# Patient Record
Sex: FEMALE | Race: OTHER | HISPANIC OR LATINO | URBAN - METROPOLITAN AREA
[De-identification: names, ages, dates, MRNs, and addresses within clinical notes are randomized per-mention and may not be internally consistent; named-entity substitution may affect disease eponyms.]

---

## 2024-10-16 ENCOUNTER — EMERGENCY (EMERGENCY)
Facility: HOSPITAL | Age: 44
LOS: 1 days | Discharge: HOME | End: 2024-10-16
Attending: EMERGENCY MEDICINE | Admitting: EMERGENCY MEDICINE
Payer: OTHER MISCELLANEOUS

## 2024-10-16 VITALS
RESPIRATION RATE: 18 BRPM | DIASTOLIC BLOOD PRESSURE: 86 MMHG | SYSTOLIC BLOOD PRESSURE: 139 MMHG | WEIGHT: 220.46 LBS | HEART RATE: 109 BPM | HEIGHT: 69 IN | OXYGEN SATURATION: 97 % | TEMPERATURE: 98 F

## 2024-10-16 LAB
ALBUMIN SERPL ELPH-MCNC: 4 G/DL — SIGNIFICANT CHANGE UP (ref 3.4–5)
ALP SERPL-CCNC: 68 U/L — SIGNIFICANT CHANGE UP (ref 40–120)
ALT FLD-CCNC: 30 U/L — SIGNIFICANT CHANGE UP (ref 12–42)
ANION GAP SERPL CALC-SCNC: 7 MMOL/L — LOW (ref 9–16)
AST SERPL-CCNC: 24 U/L — SIGNIFICANT CHANGE UP (ref 15–37)
BASOPHILS # BLD AUTO: 0.05 K/UL — SIGNIFICANT CHANGE UP (ref 0–0.2)
BASOPHILS NFR BLD AUTO: 0.7 % — SIGNIFICANT CHANGE UP (ref 0–2)
BILIRUB SERPL-MCNC: 0.2 MG/DL — SIGNIFICANT CHANGE UP (ref 0.2–1.2)
BUN SERPL-MCNC: 14 MG/DL — SIGNIFICANT CHANGE UP (ref 7–23)
CALCIUM SERPL-MCNC: 9.4 MG/DL — SIGNIFICANT CHANGE UP (ref 8.5–10.5)
CHLORIDE SERPL-SCNC: 105 MMOL/L — SIGNIFICANT CHANGE UP (ref 96–108)
CO2 SERPL-SCNC: 28 MMOL/L — SIGNIFICANT CHANGE UP (ref 22–31)
CREAT SERPL-MCNC: 0.95 MG/DL — SIGNIFICANT CHANGE UP (ref 0.5–1.3)
EGFR: 76 ML/MIN/1.73M2 — SIGNIFICANT CHANGE UP
EOSINOPHIL # BLD AUTO: 0.46 K/UL — SIGNIFICANT CHANGE UP (ref 0–0.5)
EOSINOPHIL NFR BLD AUTO: 6.6 % — HIGH (ref 0–6)
GLUCOSE SERPL-MCNC: 105 MG/DL — HIGH (ref 70–99)
HCG SERPL-ACNC: 1 MIU/ML — SIGNIFICANT CHANGE UP
HCT VFR BLD CALC: 38.9 % — SIGNIFICANT CHANGE UP (ref 34.5–45)
HGB BLD-MCNC: 11.6 G/DL — SIGNIFICANT CHANGE UP (ref 11.5–15.5)
HIV 1 & 2 AB SERPL IA.RAPID: SIGNIFICANT CHANGE UP
IMM GRANULOCYTES NFR BLD AUTO: 0.4 % — SIGNIFICANT CHANGE UP (ref 0–0.9)
LYMPHOCYTES # BLD AUTO: 1.61 K/UL — SIGNIFICANT CHANGE UP (ref 1–3.3)
LYMPHOCYTES # BLD AUTO: 23.1 % — SIGNIFICANT CHANGE UP (ref 13–44)
MCHC RBC-ENTMCNC: 28.2 PG — SIGNIFICANT CHANGE UP (ref 27–34)
MCHC RBC-ENTMCNC: 29.8 GM/DL — LOW (ref 32–36)
MCV RBC AUTO: 94.4 FL — SIGNIFICANT CHANGE UP (ref 80–100)
MONOCYTES # BLD AUTO: 0.61 K/UL — SIGNIFICANT CHANGE UP (ref 0–0.9)
MONOCYTES NFR BLD AUTO: 8.8 % — SIGNIFICANT CHANGE UP (ref 2–14)
NEUTROPHILS # BLD AUTO: 4.2 K/UL — SIGNIFICANT CHANGE UP (ref 1.8–7.4)
NEUTROPHILS NFR BLD AUTO: 60.4 % — SIGNIFICANT CHANGE UP (ref 43–77)
NRBC # BLD: 0 /100 WBCS — SIGNIFICANT CHANGE UP (ref 0–0)
PLATELET # BLD AUTO: 296 K/UL — SIGNIFICANT CHANGE UP (ref 150–400)
POTASSIUM SERPL-MCNC: 4 MMOL/L — SIGNIFICANT CHANGE UP (ref 3.5–5.3)
POTASSIUM SERPL-SCNC: 4 MMOL/L — SIGNIFICANT CHANGE UP (ref 3.5–5.3)
PROT SERPL-MCNC: 7.6 G/DL — SIGNIFICANT CHANGE UP (ref 6.4–8.2)
RBC # BLD: 4.12 M/UL — SIGNIFICANT CHANGE UP (ref 3.8–5.2)
RBC # FLD: 14.8 % — HIGH (ref 10.3–14.5)
SODIUM SERPL-SCNC: 140 MMOL/L — SIGNIFICANT CHANGE UP (ref 132–145)
WBC # BLD: 6.96 K/UL — SIGNIFICANT CHANGE UP (ref 3.8–10.5)
WBC # FLD AUTO: 6.96 K/UL — SIGNIFICANT CHANGE UP (ref 3.8–10.5)

## 2024-10-16 PROCEDURE — 99284 EMERGENCY DEPT VISIT MOD MDM: CPT

## 2024-10-16 RX ORDER — CEPHALEXIN 500 MG
1 CAPSULE ORAL
Qty: 28 | Refills: 0
Start: 2024-10-16 | End: 2024-10-22

## 2024-10-16 RX ORDER — TETANUS TOXOID, REDUCED DIPHTHERIA TOXOID AND ACELLULAR PERTUSSIS VACCINE, ADSORBED 5; 2.5; 8; 8; 2.5 [IU]/.5ML; [IU]/.5ML; UG/.5ML; UG/.5ML; UG/.5ML
0.5 SUSPENSION INTRAMUSCULAR ONCE
Refills: 0 | Status: COMPLETED | OUTPATIENT
Start: 2024-10-16 | End: 2024-10-16

## 2024-10-16 RX ORDER — EMTRICITABINE AND TENOFOVIR ALAFENAMIDE 200; 25 MG/1; MG/1
1 TABLET ORAL
Qty: 30 | Refills: 0
Start: 2024-10-16 | End: 2024-11-14

## 2024-10-16 RX ORDER — RALTEGRAVIR POTASSIUM 400 MG
1 TABLET ORAL
Qty: 60 | Refills: 0
Start: 2024-10-16 | End: 2024-11-14

## 2024-10-16 RX ADMIN — TETANUS TOXOID, REDUCED DIPHTHERIA TOXOID AND ACELLULAR PERTUSSIS VACCINE, ADSORBED 0.5 MILLILITER(S): 5; 2.5; 8; 8; 2.5 SUSPENSION INTRAMUSCULAR at 12:04

## 2024-10-16 NOTE — ED PROVIDER NOTE - CLINICAL SUMMARY MEDICAL DECISION MAKING FREE TEXT BOX
Patient with history of asthma presents with needlestick sustained while moving a trash bag just prior to arrival in ED.  On exam, patient is afebrile, vital signs are stable.  Patient is well-appearing in no acute distress.  Will update tetanus, start HIV PEP, per patient preference.  Will check basic labs including hep C and HIV.

## 2024-10-16 NOTE — ED PROVIDER NOTE - PATIENT PORTAL LINK FT
You can access the FollowMyHealth Patient Portal offered by [facility] by registering at the following website: [email or web address]. By joining [facility]’s FollowMyHealth portal, you will also be able to view your health information using other applications (apps) compatible with our system.

## 2024-10-16 NOTE — ED PROVIDER NOTE - NSFOLLOWUPINSTRUCTIONS_ED_ALL_ED_FT
Caring for Cuts and Needlesticks: What to Know  A needlestick injury happens when a person gets poked or cut accidentally by a needle or sharp object. It's common among health care workers but can happen to anyone around needles.    Needles or sharp objects may have another person's blood or body fluids on them. These fluids can carry viruses that cause infection, including:  Hepatitis B virus.  Hepatitis C virus.  HIV (human immunodeficiency virus).  What are the causes?  This injury is caused by a needle or other sharp tool that pokes or cuts your skin. It can happen when:  Giving shots.  Taking a blood sample.  Doing medical procedures with needles or scalpels.  Handling used needles.  Cleaning medical tools or patient areas.  Touching a needle that was thrown away.  Sharing needles or injecting illegal drugs.  What increases the risk?  This injury is more likely to happen to people who:  Put caps on needles.  Pass sharp objects to others.  Don't use or have access to needle safety devices.  Feel like they have to move or work fast when using needles or sharps at work.  What are the signs or symptoms?  Pain or irritation at the injury site.  Bleeding at the injury site.  How is this diagnosed?  This condition is diagnosed based on:  A physical exam.  How the injury happened.  Your health care provider may check the other person's medical history. This is because you were exposed to their blood. That person's blood may need to be tested.    How is this treated?  A person having a blood sample taken from the arm.  If you have a needle stick injury or if you think you got blood or body fluids on you:  Wash the area right away with soap and water for at least 20 seconds.  Put a bandage or clean towel on the wound.  Gently press the area to stop the bleeding.  Do not squeeze or rub the area.  Tell your supervisor or provider right away.  Follow workplace procedures, if they apply.  Get treatment as soon as possible, if you need it.  Keep your vaccinations up to date, especially as an adult. Childhood vaccines can wear off over time.  Treatments may include:  Tetanus booster shot. This may be given if you haven't had one in the past 10 years.  Hepatitis B vaccination.  Blood tests to check for infection. These may be recommended for up to 6 months after the injury.  Medicines to prevent infection (postexposure prophylaxis).  Wound care.  Follow these instructions at home:  Wound care    Take care of your wound as told. Make sure you:  Wash your hands with soap and water for at least 20 seconds before and after you change your bandage. If you can't use soap and water, use hand .  Change your bandage.  Leave stitches or skin glue alone.  Leave tape strips alone unless you're told to take them off. You may trim the edges of the tape strips if they curl up.  Keep the wound dry. Do not take baths, swim, or use a hot tub until you're told it's OK. Do not do anything that puts your wound under water.  Check the area around your wound every day for signs of infection. Check for:  Redness, swelling, or pain.  Fluid or blood.  Warmth.  Pus or a bad smell.  General instructions    Take your medicines only as told.  If you were given antibiotics, take them as told. Do not stop taking them even if you start to feel better.  Keep all follow-up visits to make sure you don't develop an infection.  Contact a health care provider if:  You have redness, swelling, or more bleeding or pain at the injury site.  You have a fever.  You feel worried, angry, or depressed.  You have trouble sleeping or feel very tired.  You feel generally sick (malaise) or get infections often.  Your skin or the white parts of your eyes turn yellow, also called jaundice.  You have pain or a feeling of fullness in your abdomen.  This information is not intended to replace advice given to you by your health care provider. Make sure you discuss any questions you have with your health care provider.

## 2024-10-16 NOTE — ED ADULT NURSE NOTE - OBJECTIVE STATEMENT
Patient presents to the ED with complaints of needle stick to the leg from a trash bag. Patient denies any pain at this time.

## 2024-10-16 NOTE — ED ADULT NURSE NOTE - HISTORY OF COVID-19 VACCINATION
Patient position supine on table . Patient prepped and draped per unit standard. Safety straps applied: Yes
Procedure start
Report called to [facility].
Vaccine status unknown

## 2024-10-16 NOTE — ED PROVIDER NOTE - OBJECTIVE STATEMENT
43-year-old female presents after needlestick.  Patient works for [employer], was moving a trash bag, and was stuck with a needle in the trash bag.  Needlestick was to patient's posterior thigh.  Last tetanus was 13 years ago.  Patient would like HIV PEP.  Patient washed out wound prior to arrival in ED.
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
## 2024-10-18 DIAGNOSIS — Z23 ENCOUNTER FOR IMMUNIZATION: ICD-10-CM

## 2024-10-18 DIAGNOSIS — S71.139A PUNCTURE WOUND WITHOUT FOREIGN BODY, UNSPECIFIED THIGH, INITIAL ENCOUNTER: ICD-10-CM

## 2024-10-18 DIAGNOSIS — J45.909 UNSPECIFIED ASTHMA, UNCOMPLICATED: ICD-10-CM
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
## 2024-10-18 LAB
HCV AB S/CO SERPL IA: 0.12 S/CO — SIGNIFICANT CHANGE UP (ref 0–0.99)
HCV AB SERPL-IMP: SIGNIFICANT CHANGE UP

## 2024-10-21 LAB
HAV IGM SER-ACNC: SIGNIFICANT CHANGE UP
HBV CORE IGM SER-ACNC: SIGNIFICANT CHANGE UP
HBV SURFACE AG SER-ACNC: SIGNIFICANT CHANGE UP
HCV AB S/CO SERPL IA: 0.13 S/CO — SIGNIFICANT CHANGE UP (ref 0–0.99)
HCV AB SERPL-IMP: SIGNIFICANT CHANGE UP